# Patient Record
Sex: FEMALE | Race: WHITE | URBAN - METROPOLITAN AREA
[De-identification: names, ages, dates, MRNs, and addresses within clinical notes are randomized per-mention and may not be internally consistent; named-entity substitution may affect disease eponyms.]

---

## 2020-08-26 ENCOUNTER — TELEPHONE (OUTPATIENT)
Dept: CARDIOLOGY | Facility: CLINIC | Age: 55
End: 2020-08-26

## 2020-09-10 NOTE — TELEPHONE ENCOUNTER
Multiple attempts made back to patient to schedule appointment with Genetic Counselor. No callback or answer. Closing encounter unless patient calls back to schedule.

## 2020-09-17 ENCOUNTER — TELEPHONE (OUTPATIENT)
Dept: CARDIOLOGY | Facility: CLINIC | Age: 55
End: 2020-09-17

## 2020-09-17 NOTE — TELEPHONE ENCOUNTER
Patient left  requesting a callback to schedule appointment.    Attempted to reach back out to patient, no answer, left .

## 2020-09-28 ENCOUNTER — DOCUMENTATION ONLY (OUTPATIENT)
Dept: CARE COORDINATION | Facility: CLINIC | Age: 55
End: 2020-09-28

## 2020-10-13 ENCOUNTER — VIRTUAL VISIT (OUTPATIENT)
Dept: CARDIOLOGY | Facility: CLINIC | Age: 55
End: 2020-10-13
Attending: GENETIC COUNSELOR, MS
Payer: COMMERCIAL

## 2020-10-13 DIAGNOSIS — Z84.81 FAMILY HISTORY OF GENE MUTATION: ICD-10-CM

## 2020-10-13 DIAGNOSIS — Z82.49 FAMILY HISTORY OF CARDIOVASCULAR DISEASE: Primary | ICD-10-CM

## 2020-10-13 PROCEDURE — 96040 HC GENETIC COUNSELING, EACH 30 MINUTES: CPT | Mod: GT | Performed by: GENETIC COUNSELOR, MS

## 2020-10-13 PROCEDURE — 99207 PR NO CHARGE LOS: CPT | Performed by: GENETIC COUNSELOR, MS

## 2020-10-13 NOTE — LETTER
"10/13/2020      RE: Sudarshan Moeller  [id number]  [place] [address]       Dear Colleague,    Thank you for the opportunity to participate in the care of your patient, Sudarshan Moeller, at the [facility] HEART [facility] at [facility]. Please see a copy of my visit note below.    Sudarshan Moeller is a 55 year old female who is being evaluated via a billable video visit.      The patient has been notified of following:     \"This video visit will be conducted via a call between you and your physician/provider. We have found that certain health care needs can be provided without the need for an in-person physical exam.  This service lets us provide the care you need with a video conversation.  If a prescription is necessary we can send it directly to your pharmacy.  If lab work is needed we can place an order for that and you can then stop by our lab to have the test done at a later time.    Video visits are billed at different rates depending on your insurance coverage.  Please reach out to your insurance provider with any questions.    If during the course of the call the physician/provider feels a video visit is not appropriate, you will not be charged for this service.\"    Patient has given verbal consent for Video visit? Yes  How would you like to obtain your AVS? Mail a copy  If you are dropped from the video visit, the video invite should be resent to: Text to cell phone: [phone]  Will anyone else be joining your video visit? No          Here is a copy of the progress note from your recent genetic counseling visit through the [facility] on Date: 10/13/2020.  Due to Covid-19 pandemic, this appointment was moved to a virtual visit.    PROGRESS NOTE: Sudarshan was seen for genetic counseling due to her family history of arrhythmogenic cardiomyopathy (ACM).  I had the opportunity to talk with Sudarshan today to discuss " the genetic component of ACM and testing options available to her .     MEDICAL HISTORY: Sudarshan reports that she is in good health.  She has no specific concerns about ACM in herself at this time.  She is interested in testing primarily to assess risk for her children.    FAMILY HISTORY:A detailed family history was obtained during today's consult.  Family history was significant for the following cardiac history: Sudarshan's maternal first cousin Debbi was diagnosed with ARVC and found to carry a mutation in the PKP2 gene.  Since that time, Debbi's brother and mother carry the PKP2 mutation as well of some of their children.  Debbi's brother has some reported symptoms of ACM but Debbi's mother, Sudarshan's aunt, does not have reported symptoms at this time.      A paternal uncle  suddenly at 77 years from a suspected stroke.  Paternal grandfather  at 70 years after experiencing a cardiac arrest.  However, he also had multiple myeloma and heart issues were thought to be related.     Sudarshan's father  at 60 years from a stroke.  She reports that he had a cardiac arrest in his 40's.  She did not think this was the result of coronary artery disease and did not think her father ever had stents or bypass surgery.     Sudarshan has four children in good health. She has three sisters, two have tested negative for the PKP2 mutation.    There is no additional history of cardiomyopathy, arrhythmias, heart attacks, fainting, sudden cardiac death, genetic conditions, or birth defects. (A copy of pedigree may be found under media tab).    DISCUSSION:Reviewed diagnosis of arrhythmogenic cardiomyopathy (ACM) found in the family.  Arrhythmogenic cardiomyopathy (ACM), has different subtypes including arrhythmogenic right ventricular cardiomyopathy/dysplasia (ARVC/D) and left dominant arrhythmogenic cardiomyopathy (LDAC).  ACM is a difficult condition to diagnose.  It is characterized by fatty replacement and fibrosis of the heart  muscle which interrupts the electrical signals being sent between the heart cells. This loss of connection between the heart cells results in arrhythmias, including palpitations, fainting, and sudden death.  ACM is frequently diagnosed as dilated cardiomyopathy during the initial phase. Explained that a good portion of ACM cases have a genetic component.     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  Explained that gene mutations in PKP2 are found in up to 70% of patients with ACM.      Mutations in the PKP2 gene are inherited in an autosomal dominant (AD) pattern. Reviewed AD inheritance. Explained that most AD cardiac mutations are inherited from a parent, therefore it is appropriate to offer genetic testing in parents,siblings, and children.  Each of those family members has a 50% risk of carrying the same gene. Explained variable expressivity and reduced penetrance which can help explain why a condition can be genetic even when there is no apparent family history.     Sudarshan understands that if she  is found to carry a mutation, she  will be at increased risk for developing the disease and each of her  children will have a 50% risk of carrying the mutation as well. If Sudarshan does NOT carry the familial mutation, she  will not be at increased risk for developing the disease and her  children will NOT be at risk for developing this form of ACM.     Reviewed logistics of testing including: Turn around time for results of 2-4 weeks. Reviewed cost of testing thru commercial lab.  Explained that they will work with insurance carrier and notify patient if out of pocket costs exceed $100.     Discussed pros and cons of genetic testing. Explained that results could significantly impact management and treatment decisions.  Reviewed possible issues associated with presymptomatic testing including genetic discrimination, current laws to prevent discrimination (ie.  SURINDER), insurance issues, and emotional and psychosocial outcomes of testing.     Recommend clinical evaluation for all first degree relatives (parents, siblings, and children) of an affected individual regardless of decision to pursue genetic testing. Clinical evaluation needs to be performed on an ongoing basis and should include history, cardiac exam, ECHO, EKG, Holter monitoring, and exercise treadmill.  ADJUST IF NEEDED    All questions answered at this time.    PLAN:Sudarshan elected to proceed with genetic testing.  Requisition and consent forms were completed and signed.  DNA will be collected via saliva sample and [facility]. I will contact patient when results are available.    TOTAL TIME SPENT IN COUNSELIN minutes    Kira Mckee MS, [facility]  Licensed, Certified Genetic Counselor  [facility]       Please do not hesitate to contact me if you have any questions/concerns.     Sincerely,     Kira Mckee GC

## 2020-10-13 NOTE — PROGRESS NOTES
Here is a copy of the progress note from your recent genetic counseling visit through the [facility] on Date: 10/13/2020.  Due to Covid-19 pandemic, this appointment was moved to a virtual visit.    PROGRESS NOTE: Sudarshan was seen for genetic counseling due to her family history of arrhythmogenic cardiomyopathy (ACM).  I had the opportunity to talk with Sudarshan today to discuss the genetic component of ACM and testing options available to her .     MEDICAL HISTORY: Sudarshan reports that she is in good health.  She has no specific concerns about ACM in herself at this time.  She is interested in testing primarily to assess risk for her children.    FAMILY HISTORY:A detailed family history was obtained during today's consult.  Family history was significant for the following cardiac history: Sudarshan's paternal first cousin Debbi was diagnosed with ARVC and found to carry a mutation in the PKP2 gene.  Since that time, Debbi's brother and mother carry the PKP2 mutation as well of some of their children.  Debbi's brother has some reported symptoms of ACM but Debbi's mother, Sudarshan's aunt, does not have reported symptoms at this time.      A paternal uncle  suddenly at 77 years from a suspected stroke.  Paternal grandfather  at 70 years after experiencing a cardiac arrest.  However, he also had multiple myeloma and heart issues were thought to be related.     Sudarshan's father  at 60 years from a stroke.  She reports that he had a cardiac arrest in his 40's.  She did not think this was the result of coronary artery disease and did not think her father ever had stents or bypass surgery.     Sudarshan has four children in good health. She has three sisters, two have tested negative for the PKP2 mutation.    There is no additional history of cardiomyopathy, arrhythmias, heart attacks, fainting, sudden cardiac death, genetic conditions, or birth defects. (A copy of pedigree may be found  under media tab).    DISCUSSION:Reviewed diagnosis of arrhythmogenic cardiomyopathy (ACM) found in the family.  Arrhythmogenic cardiomyopathy (ACM), has different subtypes including arrhythmogenic right ventricular cardiomyopathy/dysplasia (ARVC/D) and left dominant arrhythmogenic cardiomyopathy (LDAC).  ACM is a difficult condition to diagnose.  It is characterized by fatty replacement and fibrosis of the heart muscle which interrupts the electrical signals being sent between the heart cells. This loss of connection between the heart cells results in arrhythmias, including palpitations, fainting, and sudden death.  ACM is frequently diagnosed as dilated cardiomyopathy during the initial phase. Explained that a good portion of ACM cases have a genetic component.     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  Explained that gene mutations in PKP2 are found in up to 70% of patients with ACM.      Mutations in the PKP2 gene are inherited in an autosomal dominant (AD) pattern. Reviewed AD inheritance. Explained that most AD cardiac mutations are inherited from a parent, therefore it is appropriate to offer genetic testing in parents,siblings, and children.  Each of those family members has a 50% risk of carrying the same gene. Explained variable expressivity and reduced penetrance which can help explain why a condition can be genetic even when there is no apparent family history.     Sudarshan understands that if she  is found to carry a mutation, she  will be at increased risk for developing the disease and each of her  children will have a 50% risk of carrying the mutation as well. If Sudarshan does NOT carry the familial mutation, she  will not be at increased risk for developing the disease and her  children will NOT be at risk for developing this form of ACM.     Reviewed logistics of testing including: Turn around time for results of 2-4 weeks. Reviewed cost of  testing thru commercial lab.  Explained that they will work with insurance carrier and notify patient if out of pocket costs exceed $100.     Discussed pros and cons of genetic testing. Explained that results could significantly impact management and treatment decisions.  Reviewed possible issues associated with presymptomatic testing including genetic discrimination, current laws to prevent discrimination (ie. SURINDER), insurance issues, and emotional and psychosocial outcomes of testing.     Recommend clinical evaluation for all first degree relatives (parents, siblings, and children) of an affected individual regardless of decision to pursue genetic testing. Clinical evaluation needs to be performed on an ongoing basis and should include history, cardiac exam, ECHO, EKG, Holter monitoring, and exercise treadmill.  ADJUST IF NEEDED    All questions answered at this time.    PLAN:Sudarshan elected to proceed with genetic testing.  Requisition and consent forms were completed and signed.  DNA will be collected via saliva sample and [facility]. I will contact patient when results are available.    TOTAL TIME SPENT IN COUNSELIN minutes    Kira Mckee MS, [facility]  Licensed, Certified Genetic Counselor  [facility]

## 2020-10-13 NOTE — PATIENT INSTRUCTIONS
Indication for Genetic Counseling:     Arrhythmogenic cardiomyopathy (ACM) is a genetic condition that can cause irregular heartbeat, fainting, shortness of breath, heart failure, accumulation of fluid in the body, cardiac arrest, and sudden cardiac death (SCD).  AC occurs when heart cells are replaced by fat and fibrous tissue, which disrupts the electrical signaling in the heart.  ACM can occur on either the right or left side of the heart and often occurs on both sides.  When the right side is predominantly involved, it is called arrhythmogenic right ventricular cardiomyopathy/dysplasia (ARVC/D), and when the left side is predominantly involved is it called left dominant arrhythmogenic cardiomyopathy (LDAC).  Other conditions that can cause similar features as ACM are inflammation of the heart muscle (myocarditis) and infiltration of fiber cells in the heart muscle (myocardial fibrosis).     Inheritance:   Humans have over 20,000 genes that instruct our bodies how to grow and function.  We have two copies of each gene because we inherit one from our mother and one from our father.  The condition in your family is inherited in an autosomal dominant (AD) pattern. Dominant means that only one copy of the gene needs to be broken (gene mutation or pathogenic variant).  Since most people with a dominant condition have one normal gene and one broken gene, the risk that other family members carry the same gene is increased.  Parents, siblings, and children have a 50% chance of inheriting the mutation.      Testing Options:   Genetic testing is performed to assess a panel of genes known to cause this condition.  The test reads through the DNA of these genes (sequencing) to look for spelling mistakes or mutations that could cause the condition.      Genetic testing previously performed in your family member identified a gene mutation.  Therefore, instead of looking at all genes associated with this condition, this genetic  test will look for the specific mutation(s) found in your family member(s).  If the familial mutation(s) is detected, you are at increased risk to develop the condition and should follow the recommended screening guidelines provided by a cardiologist.  If the mutation is not detected, you are not at increased risk to develop the condition based on your genetic results.      Although we predict everyone who carries a mutation is at increased risk for developing the condition, we cannot predict age of onset or severity of symptoms due to reduced penetrance and variable expressivity.    Logistics:   Genetic test involves test a sample of DNA, thru blood, saliva, or cheek cells.  The sample will be sent to a laboratory to extract the DNA and sequence the genes for mutations.  The laboratory will work with your insurance company to determine the out of pocket (OOP) cost and will notify you if the OOP cost is greater than $100.  When testing is initiated, results take about 2-4 weeks to return.     Genetic Information and Nondiscrimination Act:  The Genetic Information and Nondiscrimination Act of 2008 (SURINDER) is a federal law that protects individuals from genetic discrimination in health insurance and employment. Genetic discrimination is defined as the misuse of genetic information. This law does not address potential discrimination regarding life insurance or disability insurance.      This is especially relevant for at risk individuals who are considering presymptomatic testing.    Screening Recommendations:  Clinical evaluation is recommended for all first degree relatives (parents, siblings, and children) of an affected individual regardless of decision to pursue genetic testing. Clinical evaluation should be performed at least every two years after age 10 with frequency changing after 40 years or with endurance athletes.  Evaluation should include history, cardiac exam, cardiac MRI, signal averaged EKG, exercise  stress testing, and 14 day Holter monitoring.    Resources:  Children's Cardiomyopathy Foundation - childrenscardiomyopathy.org  [place] Cardiomyopathy Association - cardiomyopathy.org  Sudden Arrhythmia Death Syndromes Foundation - sads.org  Heart Rhythm Society - HRSonline.org    General   American Heart Association - americanheart.org  Genetics Home Reference - ghr.PingCo.com.nih.gov  Genetic Information and Nondiscrimination Act - myNoticePeriod.comnaTriumfantp.org    Contact Information:  Kira Mckee MS  Licensed Genetic Counselor  [facility]  [facility]    Office:  [phone]  Appointments:  [phone]  Fax: [phone]  Email: [email or web address]

## 2020-10-13 NOTE — PROGRESS NOTES
"Sudarshan Moeller is a 55 year old female who is being evaluated via a billable video visit.      The patient has been notified of following:     \"This video visit will be conducted via a call between you and your physician/provider. We have found that certain health care needs can be provided without the need for an in-person physical exam.  This service lets us provide the care you need with a video conversation.  If a prescription is necessary we can send it directly to your pharmacy.  If lab work is needed we can place an order for that and you can then stop by our lab to have the test done at a later time.    Video visits are billed at different rates depending on your insurance coverage.  Please reach out to your insurance provider with any questions.    If during the course of the call the physician/provider feels a video visit is not appropriate, you will not be charged for this service.\"    Patient has given verbal consent for Video visit? Yes  How would you like to obtain your AVS? Mail a copy  If you are dropped from the video visit, the video invite should be resent to: Text to cell phone: [phone]  Will anyone else be joining your video visit? No        "

## 2020-10-13 NOTE — LETTER
Date:October 19, 2020      Patient was self referred, no letter generated. Do not send.        [facility] Health Information

## 2020-11-13 DIAGNOSIS — Z84.81 FAMILY HISTORY OF GENE MUTATION: ICD-10-CM

## 2020-11-13 DIAGNOSIS — Z82.49 FAMILY HISTORY OF CARDIOVASCULAR DISEASE: ICD-10-CM

## 2020-12-04 ENCOUNTER — TELEPHONE (OUTPATIENT)
Dept: CARDIOLOGY | Facility: CLINIC | Age: 55
End: 2020-12-04

## 2020-12-04 NOTE — TELEPHONE ENCOUNTER
Spoke with Sudarshan today to review results of genetic testing. She underwent genetic testing on November 13, 2020 due to the family history of ARVC and a known genetic mutation in her paternal aunt and cousins.  Genetic testing for the familial mutation was sent to [facility] and revealed that Sudarshan does NOT carry a mutation in the PKP2 gene. This result significantly reduces, but does not eliminate, her risk to develop cardiomyopathy.  Based on these results, clinical screening is not recommended for Sudarshan at this time. However, she should seek care if she has any symptoms associated with ARVC or other heart problems.   A summary letter and copy of the results will be sent to patient. All questions answered at this time.   Kira Mckee MS, CGC  Licensed, Certified Genetic Counselor  [facility]  [facility]

## 2020-12-10 LAB
LAB SCANNED RESULT: NORMAL
MISCELLANEOUS TEST: NORMAL